# Patient Record
Sex: MALE | Race: WHITE | NOT HISPANIC OR LATINO | Employment: FULL TIME | URBAN - METROPOLITAN AREA
[De-identification: names, ages, dates, MRNs, and addresses within clinical notes are randomized per-mention and may not be internally consistent; named-entity substitution may affect disease eponyms.]

---

## 2022-03-05 ENCOUNTER — MEDICAL CORRESPONDENCE (OUTPATIENT)
Dept: HEALTH INFORMATION MANAGEMENT | Facility: CLINIC | Age: 57
End: 2022-03-05
Payer: COMMERCIAL

## 2022-05-29 ENCOUNTER — HEALTH MAINTENANCE LETTER (OUTPATIENT)
Age: 57
End: 2022-05-29

## 2022-08-17 NOTE — TELEPHONE ENCOUNTER
RECORDS RECEIVED FROM: [facility]   REASON FOR VISIT: Idiopathic peripheral neuropathy    Date of Appt: 10/25/2022   NOTES (FOR ALL VISITS) STATUS DETAILS   OFFICE NOTE from referring provider Care Everywhere 02/21/2022  [facility]   OFFICE NOTE from other specialist N/A    DISCHARGE SUMMARY from hospital N/A    DISCHARGE REPORT from the ER N/A    OPERATIVE REPORT N/A    MEDICATION LIST N/A    IMAGING  (FOR ALL VISITS)     EMG N/A    EEG N/A    LUMBAR PUNCTURE N/A    VALENTINE SCAN N/A    ULTRASOUND (CAROTID BILAT) *VASCULAR* N/A    MRI (HEAD, NECK, SPINE) N/A    CT (HEAD, NECK, SPINE) N/A       Images in PACS

## 2022-10-03 ENCOUNTER — HEALTH MAINTENANCE LETTER (OUTPATIENT)
Age: 57
End: 2022-10-03

## 2022-10-25 ENCOUNTER — OFFICE VISIT (OUTPATIENT)
Dept: NEUROLOGY | Facility: CLINIC | Age: 57
End: 2022-10-25
Payer: COMMERCIAL

## 2022-10-25 ENCOUNTER — PRE VISIT (OUTPATIENT)
Dept: NEUROLOGY | Facility: CLINIC | Age: 57
End: 2022-10-25

## 2022-10-25 VITALS
HEIGHT: 69 IN | BODY MASS INDEX: 32.14 KG/M2 | WEIGHT: 217 LBS | DIASTOLIC BLOOD PRESSURE: 82 MMHG | HEART RATE: 73 BPM | SYSTOLIC BLOOD PRESSURE: 131 MMHG

## 2022-10-25 DIAGNOSIS — R20.9 DISTURBANCE OF SKIN SENSATION: ICD-10-CM

## 2022-10-25 DIAGNOSIS — R20.9 DISTURBANCE OF SKIN SENSATION: Primary | ICD-10-CM

## 2022-10-25 PROCEDURE — 86596 VOLTAGE-GTD CA CHNL ANTB EA: CPT | Mod: 90 | Performed by: PSYCHIATRY & NEUROLOGY

## 2022-10-25 PROCEDURE — 99000 SPECIMEN HANDLING OFFICE-LAB: CPT | Performed by: PSYCHIATRY & NEUROLOGY

## 2022-10-25 PROCEDURE — 36415 COLL VENOUS BLD VENIPUNCTURE: CPT | Performed by: PSYCHIATRY & NEUROLOGY

## 2022-10-25 PROCEDURE — 99204 OFFICE O/P NEW MOD 45 MIN: CPT | Performed by: PSYCHIATRY & NEUROLOGY

## 2022-10-25 PROCEDURE — 86255 FLUORESCENT ANTIBODY SCREEN: CPT | Mod: 90 | Performed by: PSYCHIATRY & NEUROLOGY

## 2022-10-25 PROCEDURE — 83519 RIA NONANTIBODY: CPT | Mod: 90 | Performed by: PSYCHIATRY & NEUROLOGY

## 2022-10-25 PROCEDURE — 86038 ANTINUCLEAR ANTIBODIES: CPT | Performed by: PSYCHIATRY & NEUROLOGY

## 2022-10-25 PROCEDURE — 83520 IMMUNOASSAY QUANT NOS NONAB: CPT | Mod: 90 | Performed by: PSYCHIATRY & NEUROLOGY

## 2022-10-25 RX ORDER — MELATONIN 10 MG
10 CAPSULE ORAL
COMMUNITY

## 2022-10-25 RX ORDER — MULTIPLE VITAMINS W/ MINERALS TAB 9MG-400MCG
1 TAB ORAL EVERY MORNING
COMMUNITY

## 2022-10-25 RX ORDER — ZALEPLON 10 MG/1
CAPSULE ORAL
COMMUNITY
Start: 2022-10-25

## 2022-10-25 RX ORDER — CETIRIZINE HYDROCHLORIDE 10 MG/1
10 TABLET ORAL
COMMUNITY
Start: 2000-01-25

## 2022-10-25 RX ORDER — OMEPRAZOLE 10 MG/1
CAPSULE, DELAYED RELEASE ORAL
COMMUNITY
Start: 2008-01-25

## 2022-10-25 NOTE — PROGRESS NOTES
Consultation for possible small fiber neuropathy. In July 2021 noted unprovoked paresthesias and sunburn-like sensation on arms and forearms, which has persisted and affects legs as well. When asked for its most prominent feature he describes it as a slight tingling, without prominent itch. Denies allodynia or hyperalgesia. At times involves trunk and/or face. Not severe. Not frankly painful. No sensory less, weakness, or incoordination. More noticeable at night but present during the day. Not worsened by any particular activity, time of day, or ambient temperature.     No rash, no joint pain, no sicca. No family history of similar symptoms.     General physical examination: skin normal.    Mental state: Alert, appropriate, speech, language, and thought content normal.     Cranial nerves II-XII normal.    Sensory examination:     Right Left   Light touch Normal Normal   Vibration (timed)     Vibration (Rydell-Seiffer) 7  5 7   Temp Normal forearm Normal forearm   Pin Normal Normal   Pos     Legend:   MM = medial malleolus, TT = tibial tuberosity, K = patella, MCP = MCP joint  MF = mid-foot, DC = distal calf, MC = mid calf, PC = proximal calf    No allodynia or hyperalgesia    Motor examination:     Right Left   Shoulder abduction  5 5   Elbow extension 5 5   Elbow flexion 5 5   Wrist extension  5 5   Finger extension 5 5   FDI 5 5   APB 5 5   Hip flexion 5 5   Knee flexion 5 5   Knee extension 5 5   Dorsiflexion 5 5   Plantar flexion 5 5   A=atrophy    Tone normal     Reflexes:   Right Left   Biceps 2 2   BRD 2 2   Triceps 2 2   Christin 2 2   Patellar 2 2   Achilles 2 2   Plantar Flexor Flexor   Clonus Absent Absent      Coordination:  Finger-nose normal.  Heel-shin normal.  RRMs normal.    Gait:  Normal.  Romberg negative.    Impression:  Mild subjective positive sensory symptom with a benign examination.   I explained that small fiber neuropathy is possible though I suspect not likely. As it happens his symptoms  are not prominent in areas where we have normative data for ENF density; hence I offered an outside referral for QSART ([place] or [facility]) or TST ([place]). I do recommend further laboratory testing as well. He chooses to hold off on further testing at this point other than labs.    Recommendations:  Dr Jalloh raised the question of small fiber neuropathy, correctly. I think that is possible but I am not sure it is likely. We discussed options and will do the followin. Blood tests today. Results will come back within 1-2 weeks, except for one that may tke 3-4 weeks.  2. Consider a dermatology evaluation; it will likely be normal but is reasonable to be sure we are not missing something obvious such as dry skin or some other condition a neurologist would not know about. You can discuss this with Dr Thakur.  3. If symptoms persist or worsen, we will do further testing of nerve function, such as sweat testing, and another clinic visit as well.       Vishnu Mack M.D.      52 minutes spent on the date of the encounter on chart review, history and examination, documentation and further activities as noted above.

## 2022-10-25 NOTE — PATIENT INSTRUCTIONS
Dr Jalloh raised the question of small fiber neuropathy, correctly. I think that is possible but I am not sure it is likely. We discussed options and will do the following:    Blood tests today. Results will come back within 1-2 weeks, except for one that may tke 3-4 weeks.  Consider a dermatology evaluation; it will likely be normal but is reasonable to be sure we are not missing something obvious such as dry skin or some other condition a neurologist would not know about. You can discuss this with Dr Thakur.  If symptoms persist or worsen, we will do further testing of nerve function, such as sweat testing, and another clinic visit as well.

## 2022-10-25 NOTE — NURSING NOTE
"Brendan Gonzalez's goals for this visit include:   Chief Complaint   Patient presents with     New Patient     Idiopathic peripheral neuropathy neurologist Ref ZANDER Cardenas at   [facility] per pt       He requests these members of his care team be copied on today's visit information: yes    PCP: Cayetano Thakur    Referring Provider:  Zander Jalloh MD  [facility]  [address] [place] [address]    /82 (BP Location: Right arm, Patient Position: Sitting, Cuff Size: Adult Regular)   Pulse 73   Ht 1.753 m (5' 9\")   Wt 98.4 kg (217 lb)   BMI 32.05 kg/m      Do you need any medication refills at today's visit? No  LILLIAN Hood, AMMON ([facility])      "

## 2022-10-25 NOTE — LETTER
10/25/2022         RE: Brendan Gonzalez  [id number] [facility]  [place] [address]        Dear Colleague,    Thank you for referring your patient, Brendan Gonzalez, to the [facility] [place]. Please see a copy of my visit note below.    Consultation for possible small fiber neuropathy. In July 2021 noted unprovoked paresthesias and sunburn-like sensation on arms and forearms, which has persisted and affects legs as well. When asked for its most prominent feature he describes it as a slight tingling, without prominent itch. Denies allodynia or hyperalgesia. At times involves trunk and/or face. Not severe. Not frankly painful. No sensory less, weakness, or incoordination. More noticeable at night but present during the day. Not worsened by any particular activity, time of day, or ambient temperature.     No rash, no joint pain, no sicca. No family history of similar symptoms.     General physical examination: skin normal.    Mental state: Alert, appropriate, speech, language, and thought content normal.     Cranial nerves II-XII normal.    Sensory examination:     Right Left   Light touch Normal Normal   Vibration (timed)     Vibration (Rydell-Seiffer) 7  5 7   Temp Normal forearm Normal forearm   Pin Normal Normal   Pos     Legend:   MM = medial malleolus, TT = tibial tuberosity, K = patella, MCP = MCP joint  MF = mid-foot, DC = distal calf, MC = mid calf, PC = proximal calf    No allodynia or hyperalgesia    Motor examination:     Right Left   Shoulder abduction  5 5   Elbow extension 5 5   Elbow flexion 5 5   Wrist extension  5 5   Finger extension 5 5   FDI 5 5   APB 5 5   Hip flexion 5 5   Knee flexion 5 5   Knee extension 5 5   Dorsiflexion 5 5   Plantar flexion 5 5   A=atrophy    Tone normal     Reflexes:   Right Left   Biceps 2 2   BRD 2 2   Triceps 2 2   Christin 2 2   Patellar 2 2   Achilles 2 2   Plantar Flexor Flexor   Clonus Absent Absent      Coordination:  Finger-nose  normal.  Heel-shin normal.  RRMs normal.    Gait:  Normal.  Romberg negative.    Impression:  Mild subjective positive sensory symptom with a benign examination.   I explained that small fiber neuropathy is possible though I suspect not likely. As it happens his symptoms are not prominent in areas where we have normative data for ENF density; hence I offered an outside referral for QSART ([facility] or [facility]) or TST ([place]). I do recommend further laboratory testing as well. He chooses to hold off on further testing at this point other than labs.    Recommendations:  Dr Jalloh raised the question of small fiber neuropathy, correctly. I think that is possible but I am not sure it is likely. We discussed options and will do the followin. Blood tests today. Results will come back within 1-2 weeks, except for one that may tke 3-4 weeks.  2. Consider a dermatology evaluation; it will likely be normal but is reasonable to be sure we are not missing something obvious such as dry skin or some other condition a neurologist would not know about. You can discuss this with Dr Thakur.  3. If symptoms persist or worsen, we will do further testing of nerve function, such as sweat testing, and another clinic visit as well.       Vishnu Mack M.D.      52 minutes spent on the date of the encounter on chart review, history and examination, documentation and further activities as noted above.            Again, thank you for allowing me to participate in the care of your patient.        Sincerely,        Vishnu Mack MD

## 2022-10-26 LAB — ANA SER QL IF: NEGATIVE

## 2022-10-31 LAB
AMPHIPHYSIN AB TITR SER: NEGATIVE TITER
CV2 IGG TITR SER: NEGATIVE TITER
GLIAL NUC TYPE 1 AB TITR SER: NEGATIVE TITER
HU1 AB TITR SER: NEGATIVE TITER
HU2 AB TITR SER IF: NEGATIVE TITER
HU3 AB TITR SER: NEGATIVE TITER
IMMUNOLOGIST REVIEW: NORMAL
LABORATORY COMMENT REPORT: NORMAL
PCA-1 AB TITR SER: NEGATIVE TITER
PCA-2 AB TITR SER: NEGATIVE TITER
PCA-TR AB TITR SER IF: NEGATIVE TITER
VGCC-P/Q BIND AB SER-SCNC: 0 NMOL/L
VGKC AB SER-SCNC: 0 NMOL/L

## 2022-11-15 LAB — SCANNED LAB RESULT: NORMAL

## 2023-06-04 ENCOUNTER — HEALTH MAINTENANCE LETTER (OUTPATIENT)
Age: 58
End: 2023-06-04

## 2024-07-28 ENCOUNTER — HEALTH MAINTENANCE LETTER (OUTPATIENT)
Age: 59
End: 2024-07-28

## 2025-08-10 ENCOUNTER — HEALTH MAINTENANCE LETTER (OUTPATIENT)
Age: 60
End: 2025-08-10